# Patient Record
Sex: MALE | URBAN - METROPOLITAN AREA
[De-identification: names, ages, dates, MRNs, and addresses within clinical notes are randomized per-mention and may not be internally consistent; named-entity substitution may affect disease eponyms.]

---

## 2021-09-14 ENCOUNTER — APPOINTMENT (RX ONLY)
Dept: URBAN - METROPOLITAN AREA CLINIC 87 | Facility: CLINIC | Age: 26
Setting detail: DERMATOLOGY
End: 2021-09-14

## 2021-09-14 VITALS — HEIGHT: 66 IN | WEIGHT: 151 LBS

## 2021-09-14 DIAGNOSIS — L40.0 PSORIASIS VULGARIS: ICD-10-CM

## 2021-09-14 PROCEDURE — ? ORDER TESTS

## 2021-09-14 PROCEDURE — ? PRESCRIPTION

## 2021-09-14 PROCEDURE — ? TALTZ INITIATION

## 2021-09-14 PROCEDURE — 99204 OFFICE O/P NEW MOD 45 MIN: CPT

## 2021-09-14 PROCEDURE — ? COUNSELING

## 2021-09-14 PROCEDURE — ? TREATMENT REGIMEN

## 2021-09-14 RX ORDER — TRIAMCINOLONE ACETONIDE 1 MG/G
OINTMENT TOPICAL
Qty: 454 | Refills: 2 | COMMUNITY
Start: 2021-09-14

## 2021-09-14 RX ORDER — CLOBETASOL PROPIONATE 0.5 MG/G
CREAM TOPICAL
Qty: 60 | Refills: 2 | COMMUNITY
Start: 2021-09-14

## 2021-09-14 RX ORDER — BETAMETHASONE DIPROPIONATE 0.5 MG/G
CREAM, AUGMENTED TOPICAL
Qty: 50 | Refills: 2 | COMMUNITY
Start: 2021-09-14

## 2021-09-14 RX ADMIN — CLOBETASOL PROPIONATE: 0.5 CREAM TOPICAL at 00:00

## 2021-09-14 RX ADMIN — BETAMETHASONE DIPROPIONATE: 0.5 CREAM, AUGMENTED TOPICAL at 00:00

## 2021-09-14 RX ADMIN — TRIAMCINOLONE ACETONIDE: 1 OINTMENT TOPICAL at 00:00

## 2021-09-14 ASSESSMENT — LOCATION SIMPLE DESCRIPTION DERM
LOCATION SIMPLE: LOWER BACK
LOCATION SIMPLE: ABDOMEN
LOCATION SIMPLE: RIGHT PRETIBIAL REGION
LOCATION SIMPLE: LEFT PRETIBIAL REGION
LOCATION SIMPLE: CHEST
LOCATION SIMPLE: LEFT THIGH
LOCATION SIMPLE: RIGHT FOREARM
LOCATION SIMPLE: LEFT FOREARM
LOCATION SIMPLE: RIGHT THIGH
LOCATION SIMPLE: UPPER BACK

## 2021-09-14 ASSESSMENT — LOCATION DETAILED DESCRIPTION DERM
LOCATION DETAILED: STERNUM
LOCATION DETAILED: RIGHT ANTERIOR DISTAL THIGH
LOCATION DETAILED: SUPERIOR THORACIC SPINE
LOCATION DETAILED: RIGHT PROXIMAL DORSAL FOREARM
LOCATION DETAILED: LEFT PROXIMAL RADIAL DORSAL FOREARM
LOCATION DETAILED: RIGHT PROXIMAL PRETIBIAL REGION
LOCATION DETAILED: LEFT ANTERIOR DISTAL THIGH
LOCATION DETAILED: LEFT PROXIMAL PRETIBIAL REGION
LOCATION DETAILED: SUPERIOR LUMBAR SPINE
LOCATION DETAILED: PERIUMBILICAL SKIN

## 2021-09-14 ASSESSMENT — LOCATION ZONE DERM
LOCATION ZONE: LEG
LOCATION ZONE: TRUNK
LOCATION ZONE: ARM

## 2021-09-14 ASSESSMENT — BSA PSORIASIS: % BODY COVERED IN PSORIASIS: 70

## 2021-09-14 ASSESSMENT — PGA PSORIASIS: PGA PSORIASIS 2020: SEVERE

## 2021-09-14 NOTE — PROCEDURE: TREATMENT REGIMEN
Detail Level: Generalized
Treatment 1: Betamethasone diproprionate cream
Treatment 2: Clobetasol cream
Action 1: Continue
Treatment 3: Triamcinolone 0.1% ointment
Sig For Treatment 3 (If Needed): twice daily
Action 2: Start
Plan: Pt was instructed to get pre-biologic blood work. If these come back WNL, pt can return to start Taltz. We can inject him with samples and teach him how to do his own injections. Pt was also given paperwork to enroll in Taltz and to start preauth for Taltz. We also discussed Skyrizi, but pt reports that he has arthritic symptoms at his R index finger and at the R foot.

## 2021-09-14 NOTE — PROCEDURE: MIPS QUALITY
Quality 226: Preventive Care And Screening: Tobacco Use: Screening And Cessation Intervention: Patient screened for tobacco use and is an ex/non-smoker
Quality 130: Documentation Of Current Medications In The Medical Record: Current Medications Documented
Quality 431: Preventive Care And Screening: Unhealthy Alcohol Use - Screening: Patient identified as an unhealthy alcohol user when screened for unhealthy alcohol use using a systematic screening method and received brief counseling
Detail Level: Simple

## 2021-09-14 NOTE — PROCEDURE: TALTZ INITIATION
Is Cyclosporine Contraindicated?: No
Detail Level: Zone
Diagnosis (Required): Psoriasis
Pregnancy And Lactation Warning Text: The risk during pregnancy and breastfeeding is uncertain with this medication.
Taltz Monitoring Guidelines: A yearly test for tuberculosis is required while taking Taltz.
Taltz Dosing: 160mg SC x 1 at weeks 0 then 80mg SC at weeks 2, 4, 6, 8, 10 and 12 then 80mg SC every four weeks

## 2021-09-21 ENCOUNTER — APPOINTMENT (RX ONLY)
Dept: URBAN - METROPOLITAN AREA CLINIC 87 | Facility: CLINIC | Age: 26
Setting detail: DERMATOLOGY
End: 2021-09-21

## 2021-09-21 VITALS — HEIGHT: 66 IN | WEIGHT: 150 LBS | TEMPERATURE: 97.8 F

## 2021-09-21 DIAGNOSIS — L40.0 PSORIASIS VULGARIS: ICD-10-CM

## 2021-09-21 PROCEDURE — ? TALTZ INJECTION

## 2021-09-21 PROCEDURE — ? TREATMENT REGIMEN

## 2021-09-21 PROCEDURE — ? COUNSELING

## 2021-09-21 PROCEDURE — 96372 THER/PROPH/DIAG INJ SC/IM: CPT

## 2021-09-21 PROCEDURE — ? TALTZ INITIATION

## 2021-09-21 ASSESSMENT — LOCATION SIMPLE DESCRIPTION DERM
LOCATION SIMPLE: LEFT FOREARM
LOCATION SIMPLE: ABDOMEN
LOCATION SIMPLE: UPPER BACK
LOCATION SIMPLE: RIGHT THIGH
LOCATION SIMPLE: RIGHT FOREARM
LOCATION SIMPLE: LEFT THIGH
LOCATION SIMPLE: LOWER BACK
LOCATION SIMPLE: LEFT PRETIBIAL REGION
LOCATION SIMPLE: CHEST
LOCATION SIMPLE: RIGHT PRETIBIAL REGION

## 2021-09-21 ASSESSMENT — LOCATION DETAILED DESCRIPTION DERM
LOCATION DETAILED: PERIUMBILICAL SKIN
LOCATION DETAILED: STERNUM
LOCATION DETAILED: LEFT PROXIMAL RADIAL DORSAL FOREARM
LOCATION DETAILED: LEFT ANTERIOR PROXIMAL THIGH
LOCATION DETAILED: RIGHT PROXIMAL DORSAL FOREARM
LOCATION DETAILED: SUPERIOR LUMBAR SPINE
LOCATION DETAILED: RIGHT PROXIMAL PRETIBIAL REGION
LOCATION DETAILED: RIGHT ANTERIOR DISTAL THIGH
LOCATION DETAILED: SUPERIOR THORACIC SPINE
LOCATION DETAILED: LEFT ANTERIOR DISTAL THIGH
LOCATION DETAILED: LEFT PROXIMAL PRETIBIAL REGION

## 2021-09-21 ASSESSMENT — LOCATION ZONE DERM
LOCATION ZONE: TRUNK
LOCATION ZONE: ARM
LOCATION ZONE: LEG

## 2021-09-21 NOTE — PROCEDURE: TREATMENT REGIMEN
Sig For Treatment 3 (If Needed): twice daily
Plan: Continue topicals as needed. First loading dose done today
Action 4: Continue
[Patient Intake Form Reviewed] : Patient intake form was reviewed
Action 2: Start
Treatment 1: Betamethasone diproprionate cream
Detail Level: Generalized
Treatment 3: Triamcinolone 0.1% ointment
Treatment 2: Clobetasol cream

## 2021-09-21 NOTE — PROCEDURE: TALTZ INJECTION
Lot # (Optional): [id number]
Taltz Amount: 160 mg
J-Code: 
94976 Billing Preferences: 1
Hide Non-Enhanced Ndc Variable: No
Expiration Date (Optional): 01/2023
Detail Level: None
Ndc (80 Mg/Ml Syringe): [address]-24
Ndc (80 Mg/Ml Pen): [address]-79
Syringe Size Used (Required For Enhanced Ndc): 80 mg/ml Prefilled Pen
Consent: The risks of pain and injection site reactions were reviewed with the patient prior to the injection.
Administered By (Optional): PRATEEK Garcia
Render If Medication Purchased By Clinic In Visit Note?: Yes

## 2021-09-21 NOTE — PROCEDURE: TALTZ INITIATION
Is Soriatane Contraindicated?: No
Detail Level: Zone
Taltz Dosing: 160mg SC x 1 at weeks 0 then 80mg SC at weeks 2, 4, 6, 8, 10 and 12 then 80mg SC every four weeks
Pregnancy And Lactation Warning Text: The risk during pregnancy and breastfeeding is uncertain with this medication.
Diagnosis (Required): Psoriasis
Taltz Monitoring Guidelines: A yearly test for tuberculosis is required while taking Taltz.

## 2021-10-05 ENCOUNTER — APPOINTMENT (RX ONLY)
Dept: URBAN - METROPOLITAN AREA CLINIC 87 | Facility: CLINIC | Age: 26
Setting detail: DERMATOLOGY
End: 2021-10-05

## 2021-10-05 DIAGNOSIS — L40.0 PSORIASIS VULGARIS: ICD-10-CM

## 2021-10-05 PROCEDURE — ? TALTZ INJECTION

## 2021-10-05 PROCEDURE — ? COUNSELING

## 2021-10-05 PROCEDURE — ? TREATMENT REGIMEN

## 2021-10-05 PROCEDURE — ? ORDER TESTS

## 2021-10-05 PROCEDURE — 96372 THER/PROPH/DIAG INJ SC/IM: CPT

## 2021-10-05 ASSESSMENT — LOCATION DETAILED DESCRIPTION DERM
LOCATION DETAILED: STERNUM
LOCATION DETAILED: PERIUMBILICAL SKIN
LOCATION DETAILED: SUPERIOR THORACIC SPINE
LOCATION DETAILED: SUPERIOR LUMBAR SPINE
LOCATION DETAILED: LEFT PROXIMAL PRETIBIAL REGION
LOCATION DETAILED: RIGHT PROXIMAL PRETIBIAL REGION
LOCATION DETAILED: LEFT ANTERIOR DISTAL THIGH
LOCATION DETAILED: RIGHT ANTERIOR DISTAL THIGH
LOCATION DETAILED: LEFT ANTERIOR PROXIMAL THIGH
LOCATION DETAILED: RIGHT PROXIMAL DORSAL FOREARM
LOCATION DETAILED: LEFT PROXIMAL RADIAL DORSAL FOREARM

## 2021-10-05 ASSESSMENT — LOCATION SIMPLE DESCRIPTION DERM
LOCATION SIMPLE: LOWER BACK
LOCATION SIMPLE: RIGHT THIGH
LOCATION SIMPLE: LEFT PRETIBIAL REGION
LOCATION SIMPLE: RIGHT PRETIBIAL REGION
LOCATION SIMPLE: CHEST
LOCATION SIMPLE: ABDOMEN
LOCATION SIMPLE: RIGHT FOREARM
LOCATION SIMPLE: LEFT FOREARM
LOCATION SIMPLE: UPPER BACK
LOCATION SIMPLE: LEFT THIGH

## 2021-10-05 ASSESSMENT — LOCATION ZONE DERM
LOCATION ZONE: TRUNK
LOCATION ZONE: LEG
LOCATION ZONE: ARM

## 2021-10-05 NOTE — PROCEDURE: TALTZ INJECTION
Detail Level: None
Taltz Amount: 80 mg
Include J-Code In Bill: No
Treatment Number (Optional): 2
Lot # (Optional): [id number]
Use Enhanced Ndc?: Yes
Consent: The risks of pain and injection site reactions were reviewed with the patient prior to the injection.
Expiration Date (Optional): 12/2022
Ndc (80 Mg/Ml Pen): [address]-52
Administered By (Optional): PRATEEK Garcia
J-Code: 
05466 Billing Preferences: 1
Ndc (80 Mg/Ml Syringe): [address]-54
Syringe Size Used (Required For Enhanced Ndc): 80 mg/ml Prefilled Pen

## 2021-10-05 NOTE — PROCEDURE: TREATMENT REGIMEN
Action 3: Continue
Detail Level: Generalized
Plan: Continue topicals as needed. Second loading dose done today

## 2021-10-05 NOTE — PROCEDURE: MIPS QUALITY
Detail Level: Simple
Quality 431: Preventive Care And Screening: Unhealthy Alcohol Use - Screening: Patient identified as an unhealthy alcohol user when screened for unhealthy alcohol use using a systematic screening method and received brief counseling
Quality 226: Preventive Care And Screening: Tobacco Use: Screening And Cessation Intervention: Patient screened for tobacco use and is an ex/non-smoker
Quality 130: Documentation Of Current Medications In The Medical Record: Current Medications Documented

## 2021-10-19 ENCOUNTER — RX ONLY (OUTPATIENT)
Age: 26
Setting detail: RX ONLY
End: 2021-10-19

## 2021-10-19 ENCOUNTER — APPOINTMENT (RX ONLY)
Dept: URBAN - METROPOLITAN AREA CLINIC 87 | Facility: CLINIC | Age: 26
Setting detail: DERMATOLOGY
End: 2021-10-19

## 2021-10-19 DIAGNOSIS — L40.0 PSORIASIS VULGARIS: ICD-10-CM

## 2021-10-19 DIAGNOSIS — L81.0 POSTINFLAMMATORY HYPERPIGMENTATION: ICD-10-CM

## 2021-10-19 PROCEDURE — ? TALTZ MONITORING

## 2021-10-19 PROCEDURE — ? TREATMENT REGIMEN

## 2021-10-19 PROCEDURE — ? TREMFYA INJECTION

## 2021-10-19 PROCEDURE — ? COUNSELING

## 2021-10-19 PROCEDURE — 96372 THER/PROPH/DIAG INJ SC/IM: CPT

## 2021-10-19 PROCEDURE — 99213 OFFICE O/P EST LOW 20 MIN: CPT | Mod: 25

## 2021-10-19 PROCEDURE — ? TREMFYA INITIATION

## 2021-10-19 PROCEDURE — ? SEPARATE AND IDENTIFIABLE DOCUMENTATION

## 2021-10-19 RX ORDER — GUSELKUMAB 100 MG/ML
INJECTION SUBCUTANEOUS
Qty: 1 | Refills: 4

## 2021-10-19 RX ORDER — GUSELKUMAB 100 MG/ML
INJECTION SUBCUTANEOUS
Qty: 1 | Refills: 0 | COMMUNITY
Start: 2021-10-19

## 2021-10-19 ASSESSMENT — LOCATION DETAILED DESCRIPTION DERM
LOCATION DETAILED: LEFT ANTERIOR DISTAL THIGH
LOCATION DETAILED: RIGHT ANTERIOR DISTAL THIGH
LOCATION DETAILED: RIGHT PROXIMAL PRETIBIAL REGION
LOCATION DETAILED: RIGHT DISTAL DORSAL FOREARM
LOCATION DETAILED: PERIUMBILICAL SKIN
LOCATION DETAILED: LEFT PROXIMAL RADIAL DORSAL FOREARM
LOCATION DETAILED: RIGHT PROXIMAL DORSAL FOREARM
LOCATION DETAILED: LEFT DISTAL ULNAR DORSAL FOREARM
LOCATION DETAILED: LEFT PROXIMAL POSTERIOR UPPER ARM
LOCATION DETAILED: LEFT PROXIMAL PRETIBIAL REGION
LOCATION DETAILED: SUPERIOR LUMBAR SPINE
LOCATION DETAILED: SUPERIOR THORACIC SPINE
LOCATION DETAILED: STERNUM

## 2021-10-19 ASSESSMENT — LOCATION SIMPLE DESCRIPTION DERM
LOCATION SIMPLE: LOWER BACK
LOCATION SIMPLE: LEFT UPPER ARM
LOCATION SIMPLE: LEFT FOREARM
LOCATION SIMPLE: RIGHT PRETIBIAL REGION
LOCATION SIMPLE: CHEST
LOCATION SIMPLE: UPPER BACK
LOCATION SIMPLE: LEFT THIGH
LOCATION SIMPLE: RIGHT THIGH
LOCATION SIMPLE: LEFT PRETIBIAL REGION
LOCATION SIMPLE: ABDOMEN
LOCATION SIMPLE: RIGHT FOREARM

## 2021-10-19 ASSESSMENT — LOCATION ZONE DERM
LOCATION ZONE: LEG
LOCATION ZONE: TRUNK
LOCATION ZONE: ARM

## 2021-10-19 ASSESSMENT — PGA PSORIASIS: PGA PSORIASIS 2020: SEVERE

## 2021-10-19 ASSESSMENT — BSA PSORIASIS: % BODY COVERED IN PSORIASIS: 70

## 2021-10-19 NOTE — PROCEDURE: TREMFYA INJECTION
Render If Medication Purchased By Clinic In Visit Note?: Yes
Expiration Date (Optional): 12/2022
Include J-Code In Bill: No
J-Code: 
Consent: The risks of pain and injection site reactions were reviewed with the patient prior to the injection.
Treatment Number (Optional): 1
Administered By (Optional): PRATEEK Garcia
Syringe Size Used (Required For Enhanced Ndc): 100 mg/ml Prefilled Syringe
Tremfya Amount: 100 mg
Detail Level: None
Ndc (100 Mg/Mg Injector): 39881-737-35
Lot # (Optional): [id number]
Ndc (100 Mg/Mg Syringe): 16099-561-88

## 2021-10-19 NOTE — PROCEDURE: TALTZ MONITORING
Add High Risk Medication Management Associated Diagnosis?: No
Detail Level: Generalized
Comments: Patient had 2 loading doses of Taltz with no side effects and improvement noted. Taltz can not be approved due to insurance issue. Switching to Tremfya and enrollment process has been stated.
Length Of Therapy: 1 month

## 2021-10-19 NOTE — PROCEDURE: TREMFYA INITIATION
Is Phototherapy Contraindicated?: No
Pregnancy And Lactation Warning Text: The risk during pregnancy and breastfeeding is uncertain with this medication.
Tremfya Monitoring Guidelines: A yearly test for tuberculosis is required while taking Tremfya.
Detail Level: Zone
Diagnosis (Required): Psoriasis
Tremfya Dosing: 100 mg SC week 0 and week 4 then every 8 weeks thereafter

## 2021-10-19 NOTE — PROCEDURE: TREATMENT REGIMEN
Action 2: Continue
Sig For Treatment 3 (If Needed): twice daily
Treatment 3: Triamcinolone 0.1% ointment
Detail Level: Generalized
Treatment 2: Clobetasol cream
Treatment 1: Betamethasone diproprionate cream

## 2021-11-03 ENCOUNTER — RX ONLY (OUTPATIENT)
Age: 26
Setting detail: RX ONLY
End: 2021-11-03

## 2021-11-03 RX ORDER — GUSELKUMAB 100 MG/ML
INJECTION SUBCUTANEOUS
Qty: 1 | Refills: 5 | Status: ERX

## 2021-11-03 RX ORDER — GUSELKUMAB 100 MG/ML
INJECTION SUBCUTANEOUS
Qty: 1 | Refills: 0 | Status: ERX

## 2021-11-16 ENCOUNTER — APPOINTMENT (RX ONLY)
Dept: URBAN - METROPOLITAN AREA CLINIC 87 | Facility: CLINIC | Age: 26
Setting detail: DERMATOLOGY
End: 2021-11-16

## 2021-11-16 DIAGNOSIS — L40.0 PSORIASIS VULGARIS: ICD-10-CM

## 2021-11-16 PROCEDURE — 96372 THER/PROPH/DIAG INJ SC/IM: CPT

## 2021-11-16 PROCEDURE — ? TREMFYA INJECTION

## 2021-11-16 PROCEDURE — ? ADDITIONAL NOTES

## 2021-11-16 ASSESSMENT — LOCATION ZONE DERM: LOCATION ZONE: ARM

## 2021-11-16 ASSESSMENT — LOCATION SIMPLE DESCRIPTION DERM: LOCATION SIMPLE: LEFT UPPER ARM

## 2021-11-16 ASSESSMENT — LOCATION DETAILED DESCRIPTION DERM: LOCATION DETAILED: LEFT PROXIMAL POSTERIOR UPPER ARM

## 2021-11-16 NOTE — PROCEDURE: MIPS QUALITY
Quality 130: Documentation Of Current Medications In The Medical Record: Current Medications Documented
Quality 226: Preventive Care And Screening: Tobacco Use: Screening And Cessation Intervention: Patient screened for tobacco use and is an ex/non-smoker
Quality 431: Preventive Care And Screening: Unhealthy Alcohol Use - Screening: Patient identified as an unhealthy alcohol user when screened for unhealthy alcohol use using a systematic screening method and received brief counseling
Detail Level: Simple

## 2021-11-16 NOTE — PROCEDURE: TREMFYA INJECTION
Consent: The risks of pain and injection site reactions were reviewed with the patient prior to the injection.
Ndc (100 Mg/Mg Injector): 02872-055-35
Treatment Number (Optional): 2
J-Code: 
Administered By (Optional): AMINTA DhillonJULIANNA
Render If Medication Purchased By Clinic In Visit Note?: Yes
Syringe Size Used (Required For Enhanced Ndc): 100 mg/ml Prefilled Syringe
Expiration Date (Optional): 05/2023
Hide Non-Enhanced Ndc Variable: No
Lot # (Optional): [id number]. [id number]
Tremfya Amount: 100 mg
Detail Level: None
Ndc (100 Mg/Mg Syringe): 22390-152-48

## 2022-01-11 ENCOUNTER — APPOINTMENT (RX ONLY)
Dept: URBAN - METROPOLITAN AREA CLINIC 87 | Facility: CLINIC | Age: 27
Setting detail: DERMATOLOGY
End: 2022-01-11

## 2022-01-11 DIAGNOSIS — L40.0 PSORIASIS VULGARIS: ICD-10-CM

## 2022-01-11 PROCEDURE — ? ADDITIONAL NOTES

## 2022-01-11 PROCEDURE — ? TREMFYA INJECTION

## 2022-01-11 PROCEDURE — 96372 THER/PROPH/DIAG INJ SC/IM: CPT

## 2022-01-11 ASSESSMENT — LOCATION DETAILED DESCRIPTION DERM: LOCATION DETAILED: LEFT PROXIMAL POSTERIOR UPPER ARM

## 2022-01-11 ASSESSMENT — LOCATION SIMPLE DESCRIPTION DERM: LOCATION SIMPLE: LEFT UPPER ARM

## 2022-01-11 ASSESSMENT — LOCATION ZONE DERM: LOCATION ZONE: ARM

## 2022-01-11 NOTE — PROCEDURE: TREMFYA INJECTION
Ndc (100 Mg/Mg Syringe): 72354-047-17
J-Code: 
Consent: The risks of pain and injection site reactions were reviewed with the patient prior to the injection.
Syringe Size Used (Required For Enhanced Ndc): 100 mg/ml Prefilled Syringe
Ndc (100 Mg/Mg Injector): 95015-669-65
Administered By (Optional): AMINTA DhillonJULIANNA
Render If Medication Purchased By Clinic In Visit Note?: Yes
Detail Level: None
Expiration Date (Optional): 05/2023
Hide Non-Enhanced Ndc Variable: No
Tremfya Amount: 100 mg
Lot # (Optional): [id number]. [id number]
Treatment Number (Optional): 3

## 2022-02-11 ENCOUNTER — APPOINTMENT (RX ONLY)
Dept: URBAN - METROPOLITAN AREA CLINIC 87 | Facility: CLINIC | Age: 27
Setting detail: DERMATOLOGY
End: 2022-02-11

## 2022-02-11 DIAGNOSIS — L81.0 POSTINFLAMMATORY HYPERPIGMENTATION: ICD-10-CM

## 2022-02-11 DIAGNOSIS — L65.9 NONSCARRING HAIR LOSS, UNSPECIFIED: ICD-10-CM

## 2022-02-11 DIAGNOSIS — L40.0 PSORIASIS VULGARIS: ICD-10-CM

## 2022-02-11 PROCEDURE — 99214 OFFICE O/P EST MOD 30 MIN: CPT

## 2022-02-11 PROCEDURE — ? TREATMENT REGIMEN

## 2022-02-11 PROCEDURE — ? COUNSELING

## 2022-02-11 PROCEDURE — ? PRESCRIPTION

## 2022-02-11 PROCEDURE — ? TREMFYA MONITORING

## 2022-02-11 RX ORDER — TAZAROTENE 1 MG/G
CREAM TOPICAL
Qty: 60 | Refills: 2 | Status: ERX | COMMUNITY
Start: 2022-02-11

## 2022-02-11 RX ORDER — HYDROQUINONE 4 %
CREAM (GRAM) TOPICAL
Qty: 30 | Refills: 2 | Status: ERX | COMMUNITY
Start: 2022-02-11

## 2022-02-11 RX ADMIN — Medication: at 00:00

## 2022-02-11 RX ADMIN — TAZAROTENE: 1 CREAM TOPICAL at 00:00

## 2022-02-11 ASSESSMENT — LOCATION DETAILED DESCRIPTION DERM
LOCATION DETAILED: RIGHT ANTERIOR DISTAL THIGH
LOCATION DETAILED: SUPERIOR THORACIC SPINE
LOCATION DETAILED: LEFT PROXIMAL RADIAL DORSAL FOREARM
LOCATION DETAILED: RIGHT PROXIMAL DORSAL FOREARM
LOCATION DETAILED: RIGHT SUPERIOR PARIETAL SCALP
LOCATION DETAILED: LEFT DISTAL ULNAR DORSAL FOREARM
LOCATION DETAILED: LEFT ANTERIOR DISTAL THIGH
LOCATION DETAILED: PERIUMBILICAL SKIN
LOCATION DETAILED: RIGHT DISTAL DORSAL FOREARM
LOCATION DETAILED: RIGHT PROXIMAL PRETIBIAL REGION
LOCATION DETAILED: SUPERIOR LUMBAR SPINE
LOCATION DETAILED: LEFT PROXIMAL PRETIBIAL REGION
LOCATION DETAILED: STERNUM

## 2022-02-11 ASSESSMENT — LOCATION SIMPLE DESCRIPTION DERM
LOCATION SIMPLE: SCALP
LOCATION SIMPLE: CHEST
LOCATION SIMPLE: RIGHT THIGH
LOCATION SIMPLE: LEFT PRETIBIAL REGION
LOCATION SIMPLE: LOWER BACK
LOCATION SIMPLE: ABDOMEN
LOCATION SIMPLE: LEFT THIGH
LOCATION SIMPLE: LEFT FOREARM
LOCATION SIMPLE: UPPER BACK
LOCATION SIMPLE: RIGHT PRETIBIAL REGION
LOCATION SIMPLE: RIGHT FOREARM

## 2022-02-11 ASSESSMENT — LOCATION ZONE DERM
LOCATION ZONE: SCALP
LOCATION ZONE: ARM
LOCATION ZONE: LEG
LOCATION ZONE: TRUNK

## 2022-02-11 ASSESSMENT — BSA PSORIASIS: % BODY COVERED IN PSORIASIS: 10

## 2022-02-11 ASSESSMENT — PGA PSORIASIS: PGA PSORIASIS 2020: MODERATE

## 2022-02-11 NOTE — HPI: MEDICATION (TREMFYA)
How Severe Is It?: moderate
Is This A New Presentation, Or A Follow-Up?: Follow Up Tremfya
Additional History: Pt reports that he is doing very well on Tremfya. Most of his psoriasis plaques are resolving with residual hyperpigmentation. He no longer feels pruritic. He has a few areas with scale, but is overall pleased with the medication. Pt reports occasional back pain and knee pain. He reports that he has gained weight since starting Tremfya. He is also feeling constipated.

## 2022-02-11 NOTE — PROCEDURE: TREMFYA MONITORING
Add High Risk Medication Management Associated Diagnosis?: No
Patient Reported Weight(Optional But Include Units): 160lbs
Length Of Therapy: 7 months
Comments: Patient is doing well on Tremfya with no side effects. Last bloodwork was done 10/2021. Ok to continue.
Detail Level: Zone

## 2022-02-11 NOTE — PROCEDURE: TREATMENT REGIMEN
Sig For Treatment 2 (If Needed): twice daily
Action 1: Continue
Detail Level: Zone
Detail Level: Generalized
Treatment 2: Clobetasol cream
Treatment 1: Betamethasone diproprionate cream
Initiate Treatment: Hydroquinone 4%- apply to discolored areas qohs x 4 months \\n\\nTazarotene 0.1%- apply to discolored areas qohs with moisturizer
Treatment 3: Triamcinolone 0.1% ointment
Otc Regimen: Rogaine for men 5% foam

## 2022-03-11 ENCOUNTER — APPOINTMENT (RX ONLY)
Dept: URBAN - METROPOLITAN AREA CLINIC 87 | Facility: CLINIC | Age: 27
Setting detail: DERMATOLOGY
End: 2022-03-11

## 2022-03-11 DIAGNOSIS — L40.0 PSORIASIS VULGARIS: ICD-10-CM

## 2022-03-11 PROCEDURE — 96372 THER/PROPH/DIAG INJ SC/IM: CPT

## 2022-03-11 PROCEDURE — ? ADDITIONAL NOTES

## 2022-03-11 PROCEDURE — ? TREMFYA INJECTION

## 2022-03-11 ASSESSMENT — LOCATION SIMPLE DESCRIPTION DERM: LOCATION SIMPLE: LEFT UPPER ARM

## 2022-03-11 ASSESSMENT — LOCATION DETAILED DESCRIPTION DERM: LOCATION DETAILED: LEFT PROXIMAL POSTERIOR UPPER ARM

## 2022-03-11 ASSESSMENT — LOCATION ZONE DERM: LOCATION ZONE: ARM

## 2022-03-11 NOTE — PROCEDURE: TREMFYA INJECTION
Include J-Code In Bill: No
Treatment Number (Optional): 4
Use Enhanced Ndc?: Yes
Ndc (100 Mg/Mg Syringe): 09343-717-13
Consent: The risks of pain and injection site reactions were reviewed with the patient prior to the injection.
Ndc (100 Mg/Mg Injector): 14721-916-71
J-Code: 
Administered By (Optional): PRATEEK Garcia
Detail Level: None
Syringe Size Used (Required For Enhanced Ndc): 100 mg/ml Prefilled Syringe
Expiration Date (Optional): 05/2023
Tremfya Amount: 100 mg
Lot # (Optional): [id number]. [id number]

## 2022-03-11 NOTE — PROCEDURE: MIPS QUALITY
Detail Level: Simple
Quality 226: Preventive Care And Screening: Tobacco Use: Screening And Cessation Intervention: Patient screened for tobacco use and is an ex/non-smoker
Quality 130: Documentation Of Current Medications In The Medical Record: Current Medications Documented
Quality 431: Preventive Care And Screening: Unhealthy Alcohol Use - Screening: Patient identified as an unhealthy alcohol user when screened for unhealthy alcohol use using a systematic screening method and received brief counseling

## 2022-05-04 ENCOUNTER — APPOINTMENT (RX ONLY)
Dept: URBAN - METROPOLITAN AREA CLINIC 87 | Facility: CLINIC | Age: 27
Setting detail: DERMATOLOGY
End: 2022-05-04

## 2022-05-04 VITALS — HEIGHT: 67 IN | WEIGHT: 160 LBS | TEMPERATURE: 97.8 F

## 2022-05-04 DIAGNOSIS — L40.0 PSORIASIS VULGARIS: ICD-10-CM

## 2022-05-04 PROCEDURE — ? TREMFYA INJECTION

## 2022-05-04 PROCEDURE — 96372 THER/PROPH/DIAG INJ SC/IM: CPT

## 2022-05-04 ASSESSMENT — LOCATION SIMPLE DESCRIPTION DERM: LOCATION SIMPLE: RIGHT UPPER ARM

## 2022-05-04 ASSESSMENT — LOCATION ZONE DERM: LOCATION ZONE: ARM

## 2022-05-04 ASSESSMENT — LOCATION DETAILED DESCRIPTION DERM: LOCATION DETAILED: RIGHT DISTAL POSTERIOR UPPER ARM

## 2022-05-04 NOTE — HPI: PROCEDURE (INJECTION)
previous_injection_has_been_previously_injected
When Was Your Last Injection?: 3/11/22
How Many Times?: 1

## 2022-05-04 NOTE — PROCEDURE: TREMFYA INJECTION
Hide Non-Enhanced Ndc Variable: No
Expiration Date (Optional): 10/2023
Use Enhanced Ndc?: Yes
Detail Level: None
Treatment Number (Optional): 2
Tremfya Amount: 100 mg
Syringe Size Used (Required For Enhanced Ndc): 100 mg/ml One-Press Injector
Ndc (100 Mg/Mg Syringe): 87891-710-54
Lot # (Optional): [id number]. [id number]
Ndc (100 Mg/Mg Injector): 26415-312-72
J-Code: 
Administered By (Optional): Jazmyne Castellanos
Consent: The risks of pain and injection site reactions were reviewed with the patient prior to the injection.

## 2022-08-23 ENCOUNTER — APPOINTMENT (RX ONLY)
Dept: URBAN - METROPOLITAN AREA CLINIC 87 | Facility: CLINIC | Age: 27
Setting detail: DERMATOLOGY
End: 2022-08-23

## 2022-08-23 VITALS — HEIGHT: 67 IN | WEIGHT: 160 LBS

## 2022-08-23 DIAGNOSIS — L40.0 PSORIASIS VULGARIS: ICD-10-CM

## 2022-08-23 DIAGNOSIS — L81.0 POSTINFLAMMATORY HYPERPIGMENTATION: ICD-10-CM

## 2022-08-23 PROCEDURE — ? ORDER TESTS

## 2022-08-23 PROCEDURE — ? PRESCRIPTION

## 2022-08-23 PROCEDURE — 99214 OFFICE O/P EST MOD 30 MIN: CPT

## 2022-08-23 PROCEDURE — ? TREMFYA INJECTION

## 2022-08-23 PROCEDURE — ? TREMFYA MONITORING

## 2022-08-23 PROCEDURE — ? TREATMENT REGIMEN

## 2022-08-23 PROCEDURE — ? COUNSELING

## 2022-08-23 RX ORDER — TAPINAROF 10 MG/1000MG
CREAM TOPICAL QD
Qty: 60 | Refills: 5 | Status: ERX | COMMUNITY
Start: 2022-08-23

## 2022-08-23 RX ORDER — BETAMETHASONE DIPROPIONATE 0.5 MG/G
OINTMENT TOPICAL
Qty: 45 | Refills: 0 | Status: ERX | COMMUNITY
Start: 2022-08-23

## 2022-08-23 RX ADMIN — BETAMETHASONE DIPROPIONATE: 0.5 OINTMENT TOPICAL at 00:00

## 2022-08-23 RX ADMIN — TAPINAROF: 10 CREAM TOPICAL at 00:00

## 2022-08-23 ASSESSMENT — LOCATION ZONE DERM
LOCATION ZONE: ARM
LOCATION ZONE: ARM

## 2022-08-23 ASSESSMENT — LOCATION DETAILED DESCRIPTION DERM
LOCATION DETAILED: RIGHT DISTAL POSTERIOR UPPER ARM
LOCATION DETAILED: LEFT ANTERIOR PROXIMAL UPPER ARM
LOCATION DETAILED: RIGHT VENTRAL PROXIMAL FOREARM

## 2022-08-23 ASSESSMENT — LOCATION SIMPLE DESCRIPTION DERM
LOCATION SIMPLE: RIGHT FOREARM
LOCATION SIMPLE: RIGHT UPPER ARM
LOCATION SIMPLE: LEFT UPPER ARM

## 2022-08-23 ASSESSMENT — BSA PSORIASIS: % BODY COVERED IN PSORIASIS: 8

## 2022-08-23 ASSESSMENT — PGA PSORIASIS: PGA PSORIASIS 2020: ALMOST CLEAR

## 2022-08-23 NOTE — HPI: MEDICATION (TREMFYA)
How Severe Is It?: mild
Is This A New Presentation, Or A Follow-Up?: Follow Up Tremfya
How Many Months Of Tremfya Have You Completed?: 12
Additional History: Pt reports that he is doing well on Tremfya. His arthritic symptoms at the wrist have also gotten better. He reports no side effects while on Tremfya. He does seem to get frequent colds, but reports that they are easy to manage and prevent from worsening.

## 2022-08-23 NOTE — PROCEDURE: TREMFYA MONITORING
Comments: Original psoriasis dx: 9/14/2021\\nOriginal Tremfya start date 10/19/2021\\n \\nPt here for 1 year Tremfya f/u, tolerating medication well and would like to continue. Pt reported last injection was June 2022. Pt will be due for another injection next week.\\n\\nPt shows almost clearance today with BSA% going from 50 to 8% in the past 3 yrs while being on Tremfya. \\n\\nNo changes to insurance reported so no new ppwk needed at this time. HOC information given to pt today. Pt was asked to sign ppwk just in case.\\n\\nAnnual bw requisition has been created and e-faxed over to appropriate diagnostic center. Pt also given copy.\\n\\nNo samples of Tremfya given today. Pt declined any Rx’s for topicals at this time.\\n\\nMessage sent today to biologic coordinator to send over new Rx along with additional refills to pharmacy.\\n\\nPrior Tried and Failed: Topical Steriods, Taltz
Detail Level: Zone
Add High Risk Medication Management Associated Diagnosis?: No

## 2022-08-23 NOTE — PROCEDURE: ORDER TESTS
Billing Type: Third-Party Bill
Expected Date Of Service: 08/23/2022
Performing Laboratory: [id number]
Bill For Surgical Tray: no

## 2022-08-23 NOTE — PROCEDURE: TREATMENT REGIMEN
Plan: Because of the extensive areas of hyperpigmentation, I did not recommend a HQ product. Pt can try nonHQ bleaching products.
Initiate Treatment: Tranexamic + kojic acid : Apply to spots on body QD for pigmentation
Detail Level: Zone
Otc Regimen: Suggested to try Lytera 2.0
Action 4: Continue
Sig For Treatment 1 (If Needed): every 8 weeks
Plan: Continue Tremfya. Pt can use Betamethasone ointment and VTama for breakthrough lesions.
Treatment 1: Tremfya 100mg injected every 8 weeks

## 2022-08-23 NOTE — PROCEDURE: TREMFYA INJECTION
Hide Non-Enhanced Ndc Variable: No
Expiration Date (Optional): 10/2023
Use Enhanced Ndc?: Yes
Detail Level: None
Treatment Number (Optional): 2
Tremfya Amount: 100 mg
Syringe Size Used (Required For Enhanced Ndc): 100 mg/ml One-Press Injector
Ndc (100 Mg/Mg Syringe): 96470-783-76
Lot # (Optional): [id number]. [id number]
Ndc (100 Mg/Mg Injector): 45256-757-98
J-Code: 
Administered By (Optional): Jazmyne Castellanos
Consent: The risks of pain and injection site reactions were reviewed with the patient prior to the injection.

## 2023-02-18 ENCOUNTER — APPOINTMENT (RX ONLY)
Dept: URBAN - METROPOLITAN AREA CLINIC 114 | Facility: CLINIC | Age: 28
Setting detail: DERMATOLOGY
End: 2023-02-18

## 2023-02-18 DIAGNOSIS — L57.0 ACTINIC KERATOSIS: ICD-10-CM

## 2024-01-25 ENCOUNTER — APPOINTMENT (RX ONLY)
Dept: URBAN - METROPOLITAN AREA CLINIC 86 | Facility: CLINIC | Age: 29
Setting detail: DERMATOLOGY
End: 2024-01-25

## 2024-01-25 VITALS — WEIGHT: 165 LBS | HEIGHT: 68 IN

## 2024-01-25 DIAGNOSIS — L40.0 PSORIASIS VULGARIS: ICD-10-CM

## 2024-01-25 PROCEDURE — ? ORDER TESTS

## 2024-01-25 PROCEDURE — ? TREMFYA MONITORING

## 2024-01-25 PROCEDURE — ? TREATMENT REGIMEN

## 2024-01-25 PROCEDURE — ? PRESCRIPTION

## 2024-01-25 PROCEDURE — 99214 OFFICE O/P EST MOD 30 MIN: CPT

## 2024-01-25 PROCEDURE — ? COUNSELING

## 2024-01-25 RX ORDER — TAPINAROF 10 MG/1000MG
CREAM TOPICAL QD
Qty: 60 | Refills: 5 | Status: ERX

## 2024-01-25 RX ORDER — BETAMETHASONE DIPROPIONATE 0.5 MG/G
OINTMENT TOPICAL
Qty: 45 | Refills: 0 | Status: ERX

## 2024-01-25 RX ORDER — HALOBETASOL PROPIONATE AND TAZAROTENE .1; .45 MG/G; MG/G
LOTION TOPICAL
Qty: 100 | Refills: 2 | Status: ERX | COMMUNITY
Start: 2024-01-25

## 2024-01-25 RX ADMIN — HALOBETASOL PROPIONATE AND TAZAROTENE: .1; .45 LOTION TOPICAL at 00:00

## 2024-01-25 ASSESSMENT — PGA PSORIASIS: PGA PSORIASIS 2020: ALMOST CLEAR

## 2024-01-25 ASSESSMENT — ITCH NUMERIC RATING SCALE: HOW SEVERE IS YOUR ITCHING?: 1

## 2024-01-25 ASSESSMENT — LOCATION SIMPLE DESCRIPTION DERM
LOCATION SIMPLE: RIGHT FOREARM
LOCATION SIMPLE: LEFT UPPER ARM

## 2024-01-25 ASSESSMENT — LOCATION DETAILED DESCRIPTION DERM
LOCATION DETAILED: RIGHT VENTRAL PROXIMAL FOREARM
LOCATION DETAILED: LEFT ANTERIOR PROXIMAL UPPER ARM

## 2024-01-25 ASSESSMENT — LOCATION ZONE DERM: LOCATION ZONE: ARM

## 2024-01-25 ASSESSMENT — BSA PSORIASIS: % BODY COVERED IN PSORIASIS: 1

## 2024-01-25 NOTE — PROCEDURE: TREATMENT REGIMEN
Action 4: Continue
Sig For Treatment 1 (If Needed): every 8 weeks
Plan: Continue Tremfya. Pt can use Betamethasone ointment and VTama for breakthrough lesions.
Detail Level: Zone
Treatment 1: Tremfya 100mg injected every 8 weeks

## 2024-01-25 NOTE — PROCEDURE: ORDER TESTS
Expected Date Of Service: 01/25/2024
Billing Type: Third-Party Bill
Bill For Surgical Tray: no
Performing Laboratory: [id number]
Lab Facility: [id number]

## 2024-01-25 NOTE — PROCEDURE: TREMFYA MONITORING
Comments: Original psoriasis dx: 9/14/2021\\nOriginal Tremfya start date 10/19/2021\\n \\nPt here for 1 year Tremfya f/u, tolerating medication well and would like to continue. Pt reported last injection was nov 2023. Pt is due for an injection this week.\\n\\nPt shows  clearance today with BSA% going from 50 to 8% to today at 1% \\unique the past 3 yrs while being on Tremfya. \\n\\nHOC information given to pt today. Pt signed ppwk today \\n\\nLab sheet was given to patient to compete.\\n\\nMessage sent today to biologic coordinator to send over new Rx along with additional refills to pharmacy.\\n\\nPrior Tried and Failed: Topical Steriods, Taltz
Length Of Therapy: 3 years
Detail Level: Zone
Add High Risk Medication Management Associated Diagnosis?: No

## 2024-08-26 ENCOUNTER — APPOINTMENT (RX ONLY)
Dept: URBAN - METROPOLITAN AREA CLINIC 86 | Facility: CLINIC | Age: 29
Setting detail: DERMATOLOGY
End: 2024-08-26

## 2024-08-26 DIAGNOSIS — L08.9 LOCAL INFECTION OF THE SKIN AND SUBCUTANEOUS TISSUE, UNSPECIFIED: ICD-10-CM

## 2024-08-26 DIAGNOSIS — L40.0 PSORIASIS VULGARIS: ICD-10-CM | Status: INADEQUATELY CONTROLLED

## 2024-08-26 PROCEDURE — 99214 OFFICE O/P EST MOD 30 MIN: CPT | Mod: 25

## 2024-08-26 PROCEDURE — 96372 THER/PROPH/DIAG INJ SC/IM: CPT

## 2024-08-26 PROCEDURE — ? TREATMENT REGIMEN

## 2024-08-26 PROCEDURE — ? TREMFYA MONITORING

## 2024-08-26 PROCEDURE — ? COUNSELING

## 2024-08-26 PROCEDURE — ? TREMFYA INJECTION

## 2024-08-26 PROCEDURE — ? PRESCRIPTION

## 2024-08-26 PROCEDURE — ? ORDER TESTS

## 2024-08-26 RX ORDER — TAPINAROF 10 MG/1000MG
CREAM TOPICAL QD
Qty: 60 | Refills: 5

## 2024-08-26 RX ORDER — MUPIROCIN 20 MG/G
OINTMENT TOPICAL
Qty: 15 | Refills: 3 | COMMUNITY
Start: 2024-08-26

## 2024-08-26 RX ORDER — BETAMETHASONE DIPROPIONATE 0.5 MG/G
OINTMENT TOPICAL
Qty: 45 | Refills: 0

## 2024-08-26 RX ORDER — MINOCYCLINE HYDROCHLORIDE 100 MG/1
CAPSULE ORAL BID
Qty: 28 | Refills: 2 | COMMUNITY
Start: 2024-08-26

## 2024-08-26 RX ORDER — HALOBETASOL PROPIONATE AND TAZAROTENE .1; .45 MG/G; MG/G
LOTION TOPICAL
Qty: 100 | Refills: 2

## 2024-08-26 RX ADMIN — MINOCYCLINE HYDROCHLORIDE: 100 CAPSULE ORAL at 00:00

## 2024-08-26 RX ADMIN — MUPIROCIN: 20 OINTMENT TOPICAL at 00:00

## 2024-08-26 ASSESSMENT — BSA PSORIASIS: % BODY COVERED IN PSORIASIS: 17

## 2024-08-26 ASSESSMENT — ITCH NUMERIC RATING SCALE: HOW SEVERE IS YOUR ITCHING?: 8

## 2024-08-26 ASSESSMENT — LOCATION SIMPLE DESCRIPTION DERM
LOCATION SIMPLE: LEFT UPPER ARM
LOCATION SIMPLE: RIGHT FOREARM
LOCATION SIMPLE: SCALP

## 2024-08-26 ASSESSMENT — PGA PSORIASIS: PGA PSORIASIS 2020: MODERATE

## 2024-08-26 ASSESSMENT — LOCATION DETAILED DESCRIPTION DERM
LOCATION DETAILED: RIGHT VENTRAL PROXIMAL FOREARM
LOCATION DETAILED: LEFT PROXIMAL POSTERIOR UPPER ARM
LOCATION DETAILED: RIGHT INFERIOR FRONTAL SCALP
LOCATION DETAILED: LEFT ANTERIOR PROXIMAL UPPER ARM

## 2024-08-26 ASSESSMENT — LOCATION ZONE DERM
LOCATION ZONE: ARM
LOCATION ZONE: SCALP

## 2024-08-26 NOTE — PROCEDURE: TREMFYA MONITORING
Comments: Original psoriasis dx: 9/14/2021\\nOriginal Tremfya start date 10/19/2021\\n \\nPt here for Tremfya f/u, tolerating medication well and would like to continue. Pt reported last injection was April 2024. Pt is overdue for his injection.\\n\\nHOC information given to pt today. Pt signed ppwk today \\n\\nLab sheet was given to patient. He was informed about [employer] to get the labs at a more affordable price. \\n\\nMessage sent today to biologic coordinator to send over new Rx along with additional refills to pharmacy.\\n\\nPrior Tried and Failed: Topical Steriods, Taltz
Length Of Therapy: 3+ years
Detail Level: Zone
Add High Risk Medication Management Associated Diagnosis?: No

## 2024-08-26 NOTE — PROCEDURE: TREMFYA INJECTION
Syringe Size Used (Required For Enhanced Ndc): 100 mg/ml One-Press Injector
Ndc (100 Mg/Mg Injector): 88963-618-41
Tremfya Amount: 100 mg
Include J-Code In Bill: No
Ndc (100 Mg/Mg Syringe): 39520-399-28
Expiration Date (Optional): 05/2025
Lot # (Optional): [id number]
Use Enhanced Ndc?: Yes
J-Code: 
Detail Level: None
Administered By (Optional): Lidia Kang, LIA
Consent: The risks of pain and injection site reactions were reviewed with the patient prior to the injection.

## 2024-08-26 NOTE — PROCEDURE: ORDER TESTS
Bill For Surgical Tray: no
Performing Laboratory: [id number]
Billing Type: Third-Party Bill
Lab Facility: [id number]
Expected Date Of Service: 08/26/2024

## 2024-08-26 NOTE — PROCEDURE: TREATMENT REGIMEN
Action 4: Continue
Sig For Treatment 1 (If Needed): every 8 weeks
Plan: Continue Tremfya. Pt can use Betamethasone ointment and VTama for breakthrough lesions.
Detail Level: Zone
Treatment 1: Tremfya 100mg injected every 8 weeks
Initiate Treatment: mupirocin 2 % topical ointment: Apply ointment to affected area bid-tid then may decrease to qd with improvement\\n\\nminocycline 100 mg capsule BID: Take one pill twice daily for 2 weeks. Take with a full glass of water. Do not lie down 1 hour after taking.
Detail Level: Simple

## 2024-09-06 ENCOUNTER — RX ONLY (OUTPATIENT)
Age: 29
Setting detail: RX ONLY
End: 2024-09-06

## 2024-09-06 RX ORDER — GUSELKUMAB 100 MG/ML
INJECTION SUBCUTANEOUS
Qty: 1 | Refills: 3 | Status: ERX

## 2024-09-26 ENCOUNTER — APPOINTMENT (RX ONLY)
Dept: URBAN - METROPOLITAN AREA CLINIC 86 | Facility: CLINIC | Age: 29
Setting detail: DERMATOLOGY
End: 2024-09-26

## 2024-09-26 ENCOUNTER — RX ONLY (OUTPATIENT)
Age: 29
Setting detail: RX ONLY
End: 2024-09-26

## 2024-09-26 VITALS — HEIGHT: 67 IN | WEIGHT: 168 LBS

## 2024-09-26 DIAGNOSIS — L08.9 LOCAL INFECTION OF THE SKIN AND SUBCUTANEOUS TISSUE, UNSPECIFIED: ICD-10-CM | Status: RESOLVED

## 2024-09-26 DIAGNOSIS — L40.0 PSORIASIS VULGARIS: ICD-10-CM | Status: STABLE

## 2024-09-26 DIAGNOSIS — L81.4 OTHER MELANIN HYPERPIGMENTATION: ICD-10-CM

## 2024-09-26 PROCEDURE — ? PRESCRIPTION

## 2024-09-26 PROCEDURE — 99214 OFFICE O/P EST MOD 30 MIN: CPT

## 2024-09-26 PROCEDURE — ? COUNSELING

## 2024-09-26 PROCEDURE — ? PRESCRIPTION SAMPLES PROVIDED

## 2024-09-26 PROCEDURE — ? TREMFYA MONITORING

## 2024-09-26 PROCEDURE — ? TREATMENT REGIMEN

## 2024-09-26 RX ORDER — GUSELKUMAB 100 MG/ML
INJECTION SUBCUTANEOUS
Qty: 1 | Refills: 3 | COMMUNITY
Start: 2024-09-26

## 2024-09-26 RX ORDER — GUSELKUMAB 100 MG/ML
INJECTION SUBCUTANEOUS
Qty: 1 | Refills: 3 | Status: CANCELLED
Stop reason: SDUPTHER

## 2024-09-26 ASSESSMENT — LOCATION DETAILED DESCRIPTION DERM
LOCATION DETAILED: RIGHT DISTAL DORSAL FOREARM
LOCATION DETAILED: RIGHT VENTRAL PROXIMAL FOREARM
LOCATION DETAILED: RIGHT INFERIOR FRONTAL SCALP
LOCATION DETAILED: LEFT ANTERIOR PROXIMAL UPPER ARM
LOCATION DETAILED: RIGHT ANTERIOR PROXIMAL THIGH
LOCATION DETAILED: RIGHT ANTERIOR DISTAL THIGH
LOCATION DETAILED: LEFT INFERIOR VERMILION LIP
LOCATION DETAILED: LEFT ANTERIOR DISTAL THIGH
LOCATION DETAILED: LEFT PROXIMAL DORSAL FOREARM

## 2024-09-26 ASSESSMENT — PGA PSORIASIS: PGA PSORIASIS 2020: ALMOST CLEAR

## 2024-09-26 ASSESSMENT — LOCATION ZONE DERM
LOCATION ZONE: ARM
LOCATION ZONE: SCALP
LOCATION ZONE: LIP
LOCATION ZONE: LEG

## 2024-09-26 ASSESSMENT — LOCATION SIMPLE DESCRIPTION DERM
LOCATION SIMPLE: LEFT UPPER ARM
LOCATION SIMPLE: RIGHT FOREARM
LOCATION SIMPLE: LEFT LIP
LOCATION SIMPLE: SCALP
LOCATION SIMPLE: LEFT FOREARM
LOCATION SIMPLE: LEFT THIGH
LOCATION SIMPLE: RIGHT THIGH

## 2024-09-26 ASSESSMENT — BSA PSORIASIS: % BODY COVERED IN PSORIASIS: 2

## 2024-09-26 ASSESSMENT — ITCH NUMERIC RATING SCALE: HOW SEVERE IS YOUR ITCHING?: 1

## 2024-09-26 NOTE — PROCEDURE: TREATMENT REGIMEN
Action 4: Continue
Sig For Treatment 1 (If Needed): every 8 weeks
Samples Given: Tremfya - pt wanted to have a sample in case he runs into trouble between insurances or pharmacy mishaps.
Plan: Continue Tremfya. Pt can use Betamethasone ointment and VTama for breakthrough lesions.
Detail Level: Zone
Treatment 1: Tremfya 100mg injected every 8 weeks
Discontinue Regimen: Minocycline - completed
Continue Regimen: Mupirocin ointment BID PRN open wounds, infections
Detail Level: Simple
Otc Regimen: Sun protection, including to the lips
Plan: Discussed with the pt that he has PIH from psoriasis and from other trauma to the skin.

## 2024-09-26 NOTE — PROCEDURE: PRESCRIPTION SAMPLES PROVIDED
Expiration Date (Optional): 07/25
Lot/Batch Number (Optional): [id number]
Samples Given: Tremfya (1)
Detail Level: Zone

## 2024-09-26 NOTE — PROCEDURE: TREMFYA MONITORING
Initial Hcv (Optional): 8/31/24 non reactive
Comments: Original psoriasis dx: 9/14/2021\\nOriginal Tremfya start date 10/19/2021-4/2024\\nRestart Tremfya 8/26/24\\n \\nPt here for Tremfya f/u, tolerating medication well and would like to continue. Pt reported last injection was August 2024. Pt states he was approved for Tremfya, and will be receiving his Tremfya next week.\\n\\nReviewed previous labs- ok to start Tremfya \\n\\nMessage sent today to biologic coordinator to send over new Rx along with additional refills to pharmacy.\\n\\nPrior Tried and Failed: Topical Steriods, Taltz
Length Of Therapy: 3+ years
Initial Lfts (Optional): 8/31/24 AST 2 ALT 23
Detail Level: Zone
Initial Quantiferon Gold (Optional): 8/31/24 Negative
Add High Risk Medication Management Associated Diagnosis?: No
Initial Cbc (Optional): 8/31/24 WBC 5.2 Hgb 18.3 (H) Hct 52.5 (H) plt 235

## 2025-04-24 ENCOUNTER — RX ONLY (RX ONLY)
Age: 30
End: 2025-04-24

## 2025-04-24 RX ORDER — GUSELKUMAB 100 MG/ML
INJECTION SUBCUTANEOUS
Qty: 1 | Refills: 2 | Status: ERX